# Patient Record
Sex: MALE | Race: WHITE | Employment: FULL TIME | URBAN - METROPOLITAN AREA
[De-identification: names, ages, dates, MRNs, and addresses within clinical notes are randomized per-mention and may not be internally consistent; named-entity substitution may affect disease eponyms.]

---

## 2018-01-29 ENCOUNTER — OFFICE VISIT (OUTPATIENT)
Dept: URGENT CARE | Facility: URGENT CARE | Age: 28
End: 2018-01-29
Payer: COMMERCIAL

## 2018-01-29 VITALS — TEMPERATURE: 98.7 F | WEIGHT: 212.8 LBS | OXYGEN SATURATION: 97 % | HEART RATE: 91 BPM

## 2018-01-29 DIAGNOSIS — J22 LOWER RESPIRATORY INFECTION: Primary | ICD-10-CM

## 2018-01-29 PROCEDURE — 99213 OFFICE O/P EST LOW 20 MIN: CPT | Performed by: PHYSICIAN ASSISTANT

## 2018-01-29 RX ORDER — PREDNISONE 20 MG/1
40 TABLET ORAL DAILY
Qty: 10 TABLET | Refills: 0 | Status: SHIPPED | OUTPATIENT
Start: 2018-01-29 | End: 2018-02-03

## 2018-01-29 RX ORDER — AZITHROMYCIN 250 MG/1
TABLET, FILM COATED ORAL
Qty: 6 TABLET | Refills: 0 | Status: SHIPPED | OUTPATIENT
Start: 2018-01-29 | End: 2018-02-17

## 2018-01-29 RX ORDER — BENZONATATE 200 MG/1
200 CAPSULE ORAL 3 TIMES DAILY PRN
Qty: 21 CAPSULE | Refills: 0 | Status: SHIPPED | OUTPATIENT
Start: 2018-01-29 | End: 2019-01-01

## 2018-01-29 NOTE — MR AVS SNAPSHOT
"              After Visit Summary   2018    Kj Fry    MRN: [id number]           Patient Information     Date Of Birth          1990        Visit Information        Provider Department      2018 5:30 PM Dara Lyle PA-C [facility]        Today's Diagnoses     Lower respiratory infection    -  1       Follow-ups after your visit        Who to contact     If you have questions or need follow up information about today's clinic visit or your schedule please contact [facility] directly at [phone].  Normal or non-critical lab and imaging results will be communicated to you by [employer], letter or phone within 4 business days after the clinic has received the results. If you do not hear from us within 7 days, please contact the clinic through [employer] or phone. If you have a critical or abnormal lab result, we will notify you by phone as soon as possible.  Submit refill requests through [employer] or call your pharmacy and they will forward the refill request to us. Please allow 3 business days for your refill to be completed.          Additional Information About Your Visit        MyChart Information     [employer] lets you send messages to your doctor, view your test results, renew your prescriptions, schedule appointments and more. To sign up, go to [email or web address] . Click on \"Log in\" on the left side of the screen, which will take you to the Welcome page. Then click on \"Sign up Now\" on the right side of the page.     You will be asked to enter the access code listed below, as well as some personal information. Please follow the directions to create your username and password.     Your access code is: [email or web address]  Expires: 2018  6:41 PM     Your access code will  in 90 days. If you need help or a new code, please call your [place] clinic or [phone].        Care EveryWhere ID     This is your Care EveryWhere ID. This could be used " by other organizations to access your [place] medical records  [id number]        Your Vitals Were     Pulse Temperature Pulse Oximetry             91 98.7  F (37.1  C) (Oral) 97%          Blood Pressure from Last 3 Encounters:   08/03/16 110/82    Weight from Last 3 Encounters:   01/29/18 212 lb 12.8 oz (96.5 kg)   08/03/16 190 lb 3.2 oz (86.3 kg)              Today, you had the following     No orders found for display         Today's Medication Changes          These changes are accurate as of 1/29/18  6:41 PM.  If you have any questions, ask your nurse or doctor.               Start taking these medicines.        Dose/Directions    azithromycin 250 MG tablet   Commonly known as:  ZITHROMAX   Used for:  Lower respiratory infection        Two tablets first day, then one tablet daily for four days.   Quantity:  6 tablet   Refills:  0       benzonatate 200 MG capsule   Commonly known as:  TESSALON   Used for:  Lower respiratory infection        Dose:  200 mg   Take 1 capsule (200 mg) by mouth 3 times daily as needed for cough   Quantity:  21 capsule   Refills:  0       predniSONE 20 MG tablet   Commonly known as:  DELTASONE   Used for:  Lower respiratory infection        Dose:  40 mg   Take 2 tablets (40 mg) by mouth daily for 5 days   Quantity:  10 tablet   Refills:  0            Where to get your medicines      These medications were sent to [facility] [address] [place] [address] [place] [address]  AT [facility]  [address] [place] [address]     Phone:  [phone]     azithromycin 250 MG tablet    benzonatate 200 MG capsule    predniSONE 20 MG tablet                Primary Care Provider Fax #    Physician No Ref-Primary [phone]       No address on file        Equal Access to Services     [facility]: Thelma Hammer, waaxda luqadaha, qaybta kaalrosario peterson. So [facility] [phone].    ATENCIÓN: Si morenita torres  disposición servicios gratuitos de asistencia lingüística. Rickey jaeger [phone].    We comply with applicable federal civil rights laws and [place] laws. We do not discriminate on the basis of race, color, national origin, age, disability, sex, sexual orientation, or gender identity.            Thank you!     Thank you for choosing [facility]  for your care. Our goal is always to provide you with excellent care. Hearing back from our patients is one way we can continue to improve our services. Please take a few minutes to complete the written survey that you may receive in the mail after your visit with us. Thank you!             Your Updated Medication List - Protect others around you: Learn how to safely use, store and throw away your medicines at www.disposemymeds.org.          This list is accurate as of 1/29/18  6:41 PM.  Always use your most recent med list.                   Brand Name Dispense Instructions for use Diagnosis    azithromycin 250 MG tablet    ZITHROMAX    6 tablet    Two tablets first day, then one tablet daily for four days.    Lower respiratory infection       benzonatate 200 MG capsule    TESSALON    21 capsule    Take 1 capsule (200 mg) by mouth 3 times daily as needed for cough    Lower respiratory infection       predniSONE 20 MG tablet    DELTASONE    10 tablet    Take 2 tablets (40 mg) by mouth daily for 5 days    Lower respiratory infection

## 2018-01-30 NOTE — PROGRESS NOTES
SUBJECTIVE:  Kj Fry is a 27 year old male who presents to the clinic today with a chief complaint of cough , shortness of breath. and wheezing. for 10 day(s).  His cough is described as persistent, nightime, nonproductive, wheezing and cough.    The patient's symptoms are moderate and stable.  Associated symptoms include wheezing. The patient's symptoms are exacerbated by no particular triggers  Patient has been using OTC cough suppressants and Mucinex  to improve symptoms. He has a history of asthma and has been using his inhaler to assist in his symptoms.     No past medical history on file.    No current outpatient prescriptions on file.       Social History   Substance Use Topics     Smoking status: Never Smoker     Smokeless tobacco: Not on file     Alcohol use Not on file     ROS:  C: NEGATIVE for fever, chills, change in weight  INTEGUMENTARY/SKIN: NEGATIVE for worrisome rashes, moles or lesions  E/M: NEGATIVE for sore throat, ear or sinus problems  R: POSITIVE for cough  CV: NEGATIVE for chest pain, palpitations or peripheral edema  GI: NEGATIVE for nausea, abdominal pain, heartburn, or change in bowel habits      OBJECTIVE:  Pulse 91  Temp 98.7  F (37.1  C) (Oral)  Wt 212 lb 12.8 oz (96.5 kg)  SpO2 97%  GENERAL APPEARANCE: healthy, alert and no distress  EYES: EOMI,  PERRL, conjunctiva clear  HENT: ear canals and TM's normal.  Nose and mouth without ulcers, erythema or lesions  NECK: supple, nontender, no lymphadenopathy  RESP: expiratory wheezes R upper posterior  CV: regular rates and rhythm, normal S1 S2, no murmur noted  ABDOMEN:  soft, nontender, no HSM or masses and bowel sounds normal  NEURO: Normal strength and tone, sensory exam grossly normal,  normal speech and mentation  SKIN: no suspicious lesions or rashes    ASSESSMENT:  Lower respiratory infection      PLAN:  Fill ZPACK if no improvement in the cough over the next 3 days. Patient agrees with treatment plan.   See orders in  Epic  Symptomatic measures encouraged, humidified air, plenty of fluids.    Dara Lyle PA-C

## 2018-02-17 ENCOUNTER — OFFICE VISIT (OUTPATIENT)
Dept: URGENT CARE | Facility: URGENT CARE | Age: 28
End: 2018-02-17
Payer: COMMERCIAL

## 2018-02-17 VITALS
RESPIRATION RATE: 24 BRPM | DIASTOLIC BLOOD PRESSURE: 90 MMHG | SYSTOLIC BLOOD PRESSURE: 130 MMHG | TEMPERATURE: 98.4 F | HEART RATE: 94 BPM | WEIGHT: 208 LBS | OXYGEN SATURATION: 94 %

## 2018-02-17 DIAGNOSIS — J11.1 INFLUENZA: Primary | ICD-10-CM

## 2018-02-17 DIAGNOSIS — R50.9 FEVER IN ADULT: ICD-10-CM

## 2018-02-17 LAB
FLUAV+FLUBV AG SPEC QL: NEGATIVE
FLUAV+FLUBV AG SPEC QL: POSITIVE
SPECIMEN SOURCE: ABNORMAL

## 2018-02-17 PROCEDURE — 99213 OFFICE O/P EST LOW 20 MIN: CPT | Performed by: NURSE PRACTITIONER

## 2018-02-17 PROCEDURE — 87804 INFLUENZA ASSAY W/OPTIC: CPT | Performed by: NURSE PRACTITIONER

## 2018-02-17 RX ORDER — ALBUTEROL SULFATE 90 UG/1
2 AEROSOL, METERED RESPIRATORY (INHALATION) EVERY 4 HOURS PRN
Qty: 1 INHALER | Refills: 1 | Status: SHIPPED | OUTPATIENT
Start: 2018-02-17

## 2018-02-17 RX ORDER — ALBUTEROL SULFATE 90 UG/1
2 AEROSOL, METERED RESPIRATORY (INHALATION) EVERY 6 HOURS
COMMUNITY
End: 2018-02-17

## 2018-02-17 NOTE — MR AVS SNAPSHOT
After Visit Summary   2/17/2018    Kj Fry    MRN: [id number]           Patient Information     Date Of Birth          1990        Visit Information        Provider Department      2/17/2018 6:15 PM Maddie Denny APRN CNP [facility]        Today's Diagnoses     Influenza    -  1    Fever in adult          Care Instructions      Influenza (Adult)    Influenza is also called the flu. It is a viral illness that affects the air passages of your lungs. It is different from the common cold. The flu can easily be passed from one to person to another. It may be spread through the air by coughing and sneezing. Or it can be spread by touching the sick person and then touching your own eyes, nose, or mouth.  The flu starts 1 to 3 days after you are exposed to the flu virus. It may last for 1 to 2 weeks but many people feel tired or fatigued for many weeks afterward. You usually don t need to take antibiotics unless you have a complication. This might be an ear or sinus infection or pneumonia.  Symptoms of the flu may be mild or severe. They can include extreme tiredness (wanting to stay in bed all day), chills, fevers, muscle aches, soreness with eye movement, headache, and a dry, hacking cough.  Home care  Follow these guidelines when caring for yourself at home:    Avoid being around cigarette smoke, whether yours or other people s.    Acetaminophen or ibuprofen will help ease your fever, muscle aches, and headache. Don t give aspirin to anyone younger than 18 who has the flu. Aspirin can harm the liver.    Nausea and loss of appetite are common with the flu. Eat light meals. Drink 6 to 8 glasses of liquids every day. Good choices are water, sport drinks, soft drinks without caffeine, juices, tea, and soup. Extra fluids will also help loosen secretions in your nose and lungs.    Over-the-counter cold medicines will not make the flu go away faster. But the  medicines may help with coughing, sore throat, and congestion in your nose and sinuses. Don t use a decongestant if you have high blood pressure.    Stay home until your fever has been gone for at least 24 hours without using medicine to reduce fever.  Follow-up care  Follow up with your healthcare provider, or as advised, if you are not getting better over the next week.  If you are age 65 or older, talk with your provider about getting a pneumococcal vaccine every 5 years. You should also get this vaccine if you have chronic asthma or COPD. All adults should get a flu vaccine every fall. Ask your provider about this.  When to seek medical advice  Call your healthcare provider right away if any of these occur:    Cough with lots of colored mucus (sputum) or blood in your mucus    Chest pain, shortness of breath, wheezing, or trouble breathing    Severe headache, or face, neck, or ear pain    New rash with fever    Fever of 100.4 F (38 C) or higher, or as directed by your healthcare provider    Confusion, behavior change, or seizure    Severe weakness or dizziness    You get a new fever or cough after getting better for a few days  Date Last Reviewed: 1/1/2017 2000-2017 [employer]. [address] [place] [address]. All rights reserved. This information is not intended as a substitute for professional medical care. Always follow your healthcare professional's instructions.                Follow-ups after your visit        Follow-up notes from your care team     Return if symptoms worsen or fail to improve, for follow up with PCP .      Who to contact     If you have questions or need follow up information about today's clinic visit or your schedule please contact [place] [facility] directly at [phone].  Normal or non-critical lab and imaging results will be communicated to you by MyChart, letter or phone within 4 business days after the clinic has received the  "results. If you do not hear from us within 7 days, please contact the clinic through [employer] or phone. If you have a critical or abnormal lab result, we will notify you by phone as soon as possible.  Submit refill requests through [employer] or call your pharmacy and they will forward the refill request to us. Please allow 3 business days for your refill to be completed.          Additional Information About Your Visit        [employer] Information     [employer] lets you send messages to your doctor, view your test results, renew your prescriptions, schedule appointments and more. To sign up, go to [email or web address] . Click on \"Log in\" on the left side of the screen, which will take you to the Welcome page. Then click on \"Sign up Now\" on the right side of the page.     You will be asked to enter the access code listed below, as well as some personal information. Please follow the directions to create your username and password.     Your access code is: [email or web address]  Expires: 2018  6:41 PM     Your access code will  in 90 days. If you need help or a new code, please call your [place] clinic or [phone].        Care EveryWhere ID     This is your Care EveryWhere ID. This could be used by other organizations to access your [place] medical records  [id number]        Your Vitals Were     Pulse Temperature Respirations Pulse Oximetry          94 98.4  F (36.9  C) 24 94%         Blood Pressure from Last 3 Encounters:   18 130/90   16 110/82    Weight from Last 3 Encounters:   18 208 lb (94.3 kg)   18 212 lb 12.8 oz (96.5 kg)   16 190 lb 3.2 oz (86.3 kg)              We Performed the Following     Influenza A/B antigen          Today's Medication Changes          These changes are accurate as of 18  8:22 PM.  If you have any questions, ask your nurse or doctor.               These medicines have changed or have updated prescriptions.        Dose/Directions    albuterol 108 " (90 BASE) MCG/ACT Inhaler   Commonly known as:  PROAIR HFA/PROVENTIL HFA/VENTOLIN HFA   This may have changed:    - when to take this  - reasons to take this   Used for:  Influenza   Changed by:  Maddie Denny APRN CNP        Dose:  2 puff   Inhale 2 puffs into the lungs every 4 hours as needed for shortness of breath / dyspnea or wheezing   Quantity:  1 Inhaler   Refills:  1            Where to get your medicines      These medications were sent to [facility] [id number] [place] [address] [address] AT [facility] [address], [facility] [address]    Hours:  24-hours Phone:  [phone]     albuterol 108 (90 BASE) MCG/ACT Inhaler                Primary Care Provider Fax #    Physician No Ref-Primary [phone]       No address on file        Equal Access to Services     TRICE REEDER : Hadii aad ku hadasho Sotaisha, waaxda luqadaha, qaybta kaalmada adeegyada, waxay molina marin . So [facility] [phone].    ATENCIÓN: Si habla español, tiene a olmstead disposición servicios gratuitos de asistencia lingüística. [facility] [phone].    We comply with applicable federal civil rights laws and [place] laws. We do not discriminate on the basis of race, color, national origin, age, disability, sex, sexual orientation, or gender identity.            Thank you!     Thank you for choosing [place] [facility]  for your care. Our goal is always to provide you with excellent care. Hearing back from our patients is one way we can continue to improve our services. Please take a few minutes to complete the written survey that you may receive in the mail after your visit with us. Thank you!             Your Updated Medication List - Protect others around you: Learn how to safely use, store and throw away your medicines at www.disposemymeds.org.          This list is accurate as of 2/17/18  8:22 PM.  Always use your most recent med list.                    Brand Name Dispense Instructions for use Diagnosis    albuterol 108 (90 BASE) MCG/ACT Inhaler    PROAIR HFA/PROVENTIL HFA/VENTOLIN HFA    1 Inhaler    Inhale 2 puffs into the lungs every 4 hours as needed for shortness of breath / dyspnea or wheezing    Influenza       benzonatate 200 MG capsule    TESSALON    21 capsule    Take 1 capsule (200 mg) by mouth 3 times daily as needed for cough    Lower respiratory infection

## 2018-02-18 NOTE — PROGRESS NOTES
SUBJECTIVE: Kj Fry is a 27 year old male, asthma history, who present complaining of flu-like symptoms: fevers, chills, nigh sweats, myalgias, congestion, sore throat, and cough for 3 days. Reports multiple ill contacts at home with flu. Last ibuprofen OTC with relief, taken 1 hour previous. Denies dyspnea or wheezing.    OBJECTIVE:  /90  Pulse 94  Temp 98.4  F (36.9  C)  Resp 24  Wt 208 lb (94.3 kg)  SpO2 94%  Appears moderately ill but not toxic; temperature as noted in vitals. Ears normal. Throat and pharynx minimal erythema.  Neck supple. No adenopathy in the neck. Sinuses non tender. CV: S1, S2 auscultated with regular rate and rhythm, no gallops, murmurs or rubs. Lungs: Bilateral lobes clear throughout without wheezes, crackles, or rhonchi noted.     ASSESSMENT: Influenza    PLAN: Discussed with patient causes for infleunza. Advised ongoing symptomatic cares with warm gargle, use acetaminophen or other OTC analgesic and fever control, increased soups, water and rest to stay hydrated. Letter for work provided.     Follow up with PCP in 1 week if not improved or sooner if symptoms worsen or do not improve as discussed.      JANESSA Carrington, CNP

## 2018-02-18 NOTE — PATIENT INSTRUCTIONS
Influenza (Adult)    Influenza is also called the flu. It is a viral illness that affects the air passages of your lungs. It is different from the common cold. The flu can easily be passed from one to person to another. It may be spread through the air by coughing and sneezing. Or it can be spread by touching the sick person and then touching your own eyes, nose, or mouth.  The flu starts 1 to 3 days after you are exposed to the flu virus. It may last for 1 to 2 weeks but many people feel tired or fatigued for many weeks afterward. You usually don t need to take antibiotics unless you have a complication. This might be an ear or sinus infection or pneumonia.  Symptoms of the flu may be mild or severe. They can include extreme tiredness (wanting to stay in bed all day), chills, fevers, muscle aches, soreness with eye movement, headache, and a dry, hacking cough.  Home care  Follow these guidelines when caring for yourself at home:    Avoid being around cigarette smoke, whether yours or other people s.    Acetaminophen or ibuprofen will help ease your fever, muscle aches, and headache. Don t give aspirin to anyone younger than 18 who has the flu. Aspirin can harm the liver.    Nausea and loss of appetite are common with the flu. Eat light meals. Drink 6 to 8 glasses of liquids every day. Good choices are water, sport drinks, soft drinks without caffeine, juices, tea, and soup. Extra fluids will also help loosen secretions in your nose and lungs.    Over-the-counter cold medicines will not make the flu go away faster. But the medicines may help with coughing, sore throat, and congestion in your nose and sinuses. Don t use a decongestant if you have high blood pressure.    Stay home until your fever has been gone for at least 24 hours without using medicine to reduce fever.  Follow-up care  Follow up with your healthcare provider, or as advised, if you are not getting better over the next week.  If you are age 65 or  older, talk with your provider about getting a pneumococcal vaccine every 5 years. You should also get this vaccine if you have chronic asthma or COPD. All adults should get a flu vaccine every fall. Ask your provider about this.  When to seek medical advice  Call your healthcare provider right away if any of these occur:    Cough with lots of colored mucus (sputum) or blood in your mucus    Chest pain, shortness of breath, wheezing, or trouble breathing    Severe headache, or face, neck, or ear pain    New rash with fever    Fever of 100.4 F (38 C) or higher, or as directed by your healthcare provider    Confusion, behavior change, or seizure    Severe weakness or dizziness    You get a new fever or cough after getting better for a few days  Date Last Reviewed: 1/1/2017 2000-2017 [employer]. [address] [place] [address], [place] [address]. All rights reserved. This information is not intended as a substitute for professional medical care. Always follow your healthcare professional's instructions.

## 2019-01-01 ENCOUNTER — OFFICE VISIT (OUTPATIENT)
Dept: URGENT CARE | Facility: URGENT CARE | Age: 29
End: 2019-01-01

## 2019-01-01 VITALS
RESPIRATION RATE: 16 BRPM | HEART RATE: 76 BPM | TEMPERATURE: 98.6 F | SYSTOLIC BLOOD PRESSURE: 126 MMHG | WEIGHT: 210 LBS | OXYGEN SATURATION: 98 % | DIASTOLIC BLOOD PRESSURE: 76 MMHG

## 2019-01-01 DIAGNOSIS — J45.21 MILD INTERMITTENT ASTHMA WITH EXACERBATION: Primary | ICD-10-CM

## 2019-01-01 PROCEDURE — 99214 OFFICE O/P EST MOD 30 MIN: CPT | Performed by: PHYSICIAN ASSISTANT

## 2019-01-01 RX ORDER — METHYLPREDNISOLONE 4 MG
TABLET, DOSE PACK ORAL
Qty: 21 TABLET | Refills: 0 | Status: SHIPPED | OUTPATIENT
Start: 2019-01-01 | End: 2020-06-13

## 2019-01-01 RX ORDER — AZITHROMYCIN 250 MG/1
TABLET, FILM COATED ORAL
Qty: 6 TABLET | Refills: 0 | Status: SHIPPED | OUTPATIENT
Start: 2019-01-01 | End: 2020-06-13

## 2019-01-01 ASSESSMENT — ENCOUNTER SYMPTOMS
ABDOMINAL PAIN: 0
SHORTNESS OF BREATH: 1
CHILLS: 0
FOCAL WEAKNESS: 0
VOMITING: 0
FEVER: 1
WHEEZING: 1
DIARRHEA: 0
COUGH: 1
HEADACHES: 1
NAUSEA: 0

## 2019-01-01 NOTE — PROGRESS NOTES
HPI  January 1, 2019    HPI: Kj Fry is a 28 year old male with hx asthma who complains of moderate productive cough, wheezing, SOB,  HA, & low grade fever onset 1 week ago. Symptoms are constant in duration. Pt is using albuterol neb & inhaler as well as Mucinex with some relief. Denies HA, CP, abd pain, N/V/D, rash, or any other symptoms. Patient denies sick contacts.    History reviewed. No pertinent past medical history.  History reviewed. No pertinent surgical history.  Social History     Tobacco Use     Smoking status: Never Smoker     Smokeless tobacco: Never Used   Substance Use Topics     Alcohol use: Not on file     Drug use: Not on file     There is no problem list on file for this patient.    History reviewed. No pertinent family history.     Problem list, Medication list, Allergies, and Medical/Social/Surgical histories reviewed in [facility] and updated as appropriate.      Review of Systems   Constitutional: Positive for fever. Negative for chills.   Respiratory: Positive for cough, shortness of breath and wheezing.    Cardiovascular: Negative for chest pain.   Gastrointestinal: Negative for abdominal pain, diarrhea, nausea and vomiting.   Skin: Negative for rash.   Neurological: Positive for headaches. Negative for focal weakness.   All other systems reviewed and are negative.        Physical Exam   Constitutional: He is oriented to person, place, and time.   HENT:   Head: Normocephalic and atraumatic.   Right Ear: Tympanic membrane and external ear normal.   Left Ear: Tympanic membrane and external ear normal.   Nose: Nose normal.   Mouth/Throat: Uvula is midline, oropharynx is clear and moist and mucous membranes are normal.   Cardiovascular: Normal rate, regular rhythm and normal heart sounds.   Pulmonary/Chest: Effort normal and breath sounds normal.   Musculoskeletal: Normal range of motion.   Neurological: He is alert and oriented to person, place, and time.   Skin: Skin is warm and dry.    Nursing note and vitals reviewed.      Vital Signs  /76 (BP Location: Left arm, Patient Position: Sitting, Cuff Size: Adult Large)   Pulse 76   Temp 98.6  F (37  C) (Tympanic)   Resp 16   Wt 95.3 kg (210 lb)   SpO2 98%      Diagnostic Test Results:  none     ASSESSMENT/PLAN      ICD-10-CM    1. Mild intermittent asthma with exacerbation J45.21 azithromycin (ZITHROMAX) 250 MG tablet     methylPREDNISolone (MEDROL DOSEPAK) 4 MG tablet therapy pack      Lungs CTAB, O2 sat 98%, no resp distress, afebrile. Suspect viral URI leading to asthma exacerbation. Rx Medrol dosepak, continue all other treatments. If not improving after another 4-5 days, may start antibiotic.      I have discussed any lab or imaging results, the patient's diagnosis, and my plan of treatment with the patient and/or family. Patient is aware to come back in if with worsening symptoms or if no relief despite treatment plan.  Patient voiced understanding and had no further questions.       Follow Up: Return if symptoms worsen or fail to improve.    JOSE MARIA Selby, PA-C  [place] [facility]

## 2020-04-14 ENCOUNTER — NURSE TRIAGE (OUTPATIENT)
Dept: NURSING | Facility: CLINIC | Age: 30
End: 2020-04-14

## 2020-04-14 ENCOUNTER — HOSPITAL ENCOUNTER (EMERGENCY)
Facility: CLINIC | Age: 30
Discharge: HOME | End: 2020-04-14
Attending: EMERGENCY MEDICINE | Admitting: EMERGENCY MEDICINE
Payer: COMMERCIAL

## 2020-04-14 ENCOUNTER — APPOINTMENT (OUTPATIENT)
Dept: GENERAL RADIOLOGY | Facility: CLINIC | Age: 30
End: 2020-04-14
Attending: EMERGENCY MEDICINE
Payer: COMMERCIAL

## 2020-04-14 VITALS
RESPIRATION RATE: 18 BRPM | TEMPERATURE: 98.1 F | HEIGHT: 68 IN | BODY MASS INDEX: 31.83 KG/M2 | WEIGHT: 210 LBS | DIASTOLIC BLOOD PRESSURE: 85 MMHG | OXYGEN SATURATION: 94 % | SYSTOLIC BLOOD PRESSURE: 138 MMHG

## 2020-04-14 DIAGNOSIS — R06.02 SHORTNESS OF BREATH: ICD-10-CM

## 2020-04-14 DIAGNOSIS — R93.89 ABNORMAL X-RAY: ICD-10-CM

## 2020-04-14 PROCEDURE — 99283 EMERGENCY DEPT VISIT LOW MDM: CPT | Mod: 25

## 2020-04-14 PROCEDURE — 71045 X-RAY EXAM CHEST 1 VIEW: CPT

## 2020-04-14 RX ORDER — PREDNISONE 20 MG/1
TABLET ORAL
Qty: 10 TABLET | Refills: 0 | Status: SHIPPED | OUTPATIENT
Start: 2020-04-14 | End: 2020-06-13

## 2020-04-14 RX ORDER — DOXYCYCLINE HYCLATE 100 MG
100 TABLET ORAL 2 TIMES DAILY
Qty: 10 TABLET | Refills: 0 | Status: SHIPPED | OUTPATIENT
Start: 2020-04-14 | End: 2020-06-13

## 2020-04-14 ASSESSMENT — ENCOUNTER SYMPTOMS
FEVER: 1
SHORTNESS OF BREATH: 1
DIARRHEA: 1
CHILLS: 1
COUGH: 1

## 2020-04-14 ASSESSMENT — MIFFLIN-ST. JEOR: SCORE: 1877.05

## 2020-04-14 NOTE — TELEPHONE ENCOUNTER
"SOB for about 1 week ago, states not uncommon this time of year.   Diarrhea the last couple of days.   Has asthma, using medication more frequently now.   + Cough x a few days, worse at night.   Feels feverish, denies fever.  Extremely fatigued.   States he feels out of it, girlfriend said he has seemed out of it.   When asked if he is confused he stated \"Debora\" but then stated its more just slower to respond.   Feeling a little lightheaded.   Slow to process per girlfriend.   Muffled speech, not slurred, thought to be from being fatigued.       RN advised of need to go to ED. Recommended having girlfriend drive him, advised of no visitors allowed in ED and to drop him off.  Patient stated understanding.     [facility] notified of pending arrival.     COVID 19 Nurse Triage Plan/Patient Instructions    Please be aware that novel coronavirus (COVID-19) may be circulating in the community. If you develop symptoms such as fever, cough, or SOB or if you have concerns about the presence of another infection including coronavirus (COVID-19), please contact your health care provider or visit [email or web address].     Disposition/Instructions    Patient to go to ED and follow protocol based instructions. Follow System Ambulatory Workflow for COVID 19.     Bring Your Own Device:  Please also bring your smart device(s) (smart phones, tablets, laptops) and their charging cables for your personal use and to communicate with your care team during your visit.  Additional COVID19 information to add for patients.     Additional General Information About COVID-19    COVID-19 - General Information:  Regardless of if you have been tested or not:  Patient who have symptoms (cough, fever, or shortness of breath), need to isolate for 7 days from when symptoms started AND 72 hours after fever resolves (without fever reducing medications) AND improvement of respiratory symptoms (whichever is longer).      Isolate yourself at " home (in own room/own bathroom if possible)    Do Not allow any visitors    Do Not go to work or school    Do Not go to [employer],  centers, shopping, or other public places.    Do Not shake hands.    Avoid close and intimate contact with others (hugging, kissing).    Follow CDC recommendations for household cleaning of frequently touched services.     After the initial 7 days, continue to isolate yourself from household members as much as possible. To continue decrease the risk of community spread and exposure, you and any members of your household should limit activities in public for 14 days after starting home isolation.     You can reference the following CDC link for helpful home isolation/care tips:  https://www.cdc.gov/coronavirus/2019-ncov/downloads/10Things.pdf    COVID-19 - Symptoms:     The COVID-19 can cause a respiratory illness, such as bronchitis or pneumonia.    The most common symptoms are: cough, fever, and shortness of breath.     Other symptoms are: body aches, chills, diarrhea, fatigue, headache, runny nose, and sore throat     COVID-19 - Exposure Risk Factors:    Exposure to a person who has been diagnosed with COVID-19 .    Travel from an area with recent local transmission of COVID-19 .    The CDC (www.cdc.gov) has the most up-to-date list of where the COVID-19 outbreak is occurring.    COVID-19 - Spreading:     The virus likely spreads through respiratory droplets produced when a person coughs or sneezes. These respiratory droplets can travel approximately 6 feet and can remain on surfaces.  Common disinfectants will kill the virus.    The CDC currently does not recommend healthy people wearing masks.    COVID-19 - Protect Yourself:     Avoid close contact with people known to have this new COVID-19 infection.    Wash hands often with soap and water or alcohol-based hand .    Avoid touching the eyes, nose or mouth.       Thank you for limiting contact with others, wearing a  simple mask to cover your cough, practice good hand hygiene habits and accessing our virtual services where possible to limit the spread of this virus.    For more information about COVID19 and options for caring for yourself at home, please visit the CDC website at https://www.cdc.gov/coronavirus/2019-ncov/about/steps-when-sick.html  For more options for care at [facility], please visit our website at [email or web address]    For more information, please use the [place] Department of Health ([facility]) COVID-19 Hotlines (Interpreters available):     Health questions: Phone Number: [phone] or [phone] and Hours: 7 a.m. to 7 p.m.    Schools and  questions: Phone Number: [phone] or [phone] and Hours 7 a.m. to 7 p.m.                    Reason for Disposition    SEVERE or constant chest pain (Exception: mild central chest pain, present only when coughing)    MODERATE difficulty breathing (e.g., speaks in phrases, SOB even at rest, pulse 100-120)    Additional Information    Negative: SEVERE difficulty breathing (e.g., struggling for each breath, speaks in single words)    Negative: Difficult to awaken or acting confused (e.g., disoriented, slurred speech)    Negative: Bluish (or gray) lips or face now    Negative: Shock suspected (e.g., cold/pale/clammy skin, too weak to stand, low BP, rapid pulse)    Negative: Sounds like a life-threatening emergency to the triager    Negative: [1] COVID-19 suspected (e.g., cough, fever, shortness of breath) AND [2] public health department recommends testing    Negative: [1] COVID-19 exposure AND [2] no symptoms    Negative: COVID-19 and Breastfeeding, questions about    Protocols used: CORONAVIRUS (COVID-19) DIAGNOSED OR [id number] 3.30.20

## 2020-04-14 NOTE — ED PROVIDER NOTES
"  History     Chief Complaint:  Shortness of Breath      HPI   Kj Fry is a 30 year old male with a history of asthma, who presents for evaluation of shortness of breath, cough, chills, diarrhea, subjective fever, and feeling overall unwell beginning yesterday. He reports being able to get dressed without feeling short of breath. His girlfriend is sick with similar symptoms as well. Patient denies known COVID-19 positive contacts. Denies any other concerns.     Allergies:  Ceclor     Medications:    Albuterol    Past Medical History:    Asthma     Past Surgical History:    History reviewed. No pertinent surgical history.     Family History:    History reviewed. No pertinent family history.      Social History:  Smoking status: never   PCP: Physician No Ref-Primary  Presents to the ED alone.   Marital Status:  Single      Review of Systems   Constitutional: Positive for chills and fever (subjective).   Respiratory: Positive for cough and shortness of breath.    Gastrointestinal: Positive for diarrhea.   All other systems reviewed and are negative.      Physical Exam     Patient Vitals for the past 24 hrs:   BP Temp Temp src Heart Rate Resp SpO2 Height Weight   04/14/20 1630 138/85 98.1  F (36.7  C) Oral 77 18 94 % 1.727 m (5' 8\") 95.3 kg (210 lb)       Physical Exam  Vitals: reviewed by me  General: Pt seen on [facility], pleasant, cooperative, and alert to conversation  Eyes: Tracking well, clear conjunctiva BL  ENT: MMM, midline trachea.   Lungs: No tachypnea, no accessory muscle use. No respiratory distress.   CV: Rate as above, regular rhythm.    MSK:  No evidence of trauma  Skin: No rash, normal turgor and temperature  Neuro: Clear speech and no facial droop.  Psych: Not RIS, no e/o AH/VH      Emergency Department Course     Imaging:  Radiographic findings were communicated with the patient who voiced understanding of the findings.    XR Chest Port 1 View  No pleural fluid or pneumothorax. Subtle " opacities at the  left lung base are likely atelectasis.  Airspace disease is possible. No interstitial edema. The  cardiomediastinal silhouette is normal in size.  As read by Radiology.    Emergency Department Course:  Past medical records, nursing notes, and vitals reviewed.    1658: I performed an exam of the patient as documented above.     The patient had a portable chest x-ray while here in the emergency department, findings above.    1808: I rechecked the patient and discussed the results of his workup thus far.     Findings and plan explained to the Patient. Patient discharged home with instructions regarding supportive care, medications, and reasons to return. The importance of close follow-up was reviewed.     I personally answered all related questions prior to discharge.     Impression & Plan   Covid-19  Kj Fry was evaluated during a global COVID-19 pandemic, which necessitated consideration that the patient might be at risk for infection with the SARS-CoV-2 virus that causes COVID-19.   Applicable protocols for evaluation were followed during the patient's care.    Medical Decision Making:  Kj Fry is a 30 year old male who presents to the ED with what appears to be shortness of breath for 2 days. He is doing quite well here, xray does show a small opacity, possibly atelectasis. No will evidence of COVID. With his asthma, will give instructions to use his home albuterol as well as steroids and antibiotics, because he is more vulnerable.  Patient is ok with this plan, saturating 95% on room air, states he can do all of his ADLs. Unfortunately, we are still not able to test this gentleman in this situation, will plan for discharge home with strict return precautions.     Diagnosis:    ICD-10-CM    1. Shortness of breath  R06.02    2. Abnormal x-ray  R93.89        Disposition:  Discharged to home.    Discharge Medications:  Discharge Medication List as of 4/14/2020  6:09 PM      START  taking these medications    Details   doxycycline hyclate (VIBRA-TABS) 100 MG tablet Take 1 tablet (100 mg) by mouth 2 times daily, Disp-10 tablet,R-0, Local Print      predniSONE (DELTASONE) 20 MG tablet Take two tablets (= 40mg) each day for 5 (five) days, Disp-10 tablet,R-0, Local Print             Scribe Disclosure:  I, Mirtha Guy, am serving as a scribe at 4:58 PM on 4/14/2020 to document services personally performed by Leander Cuellar MD based on my observations and the provider's statements to me.     Mirtha Guy  4/14/2020    EMERGENCY DEPARTMENT       Leander Cuellar MD  04/14/20 2193

## 2020-04-14 NOTE — ED AVS SNAPSHOT
Emergency Department  [address] [facility] [address]  Phone:  [phone]  Fax:  [phone]                                    Kj Fry   MRN: [id number]    Department:   Emergency Department   Date of Visit:  4/14/2020           After Visit Summary Signature Page    I have received my discharge instructions, and my questions have been answered. I have discussed any challenges I see with this plan with the nurse or doctor.    ..........................................................................................................................................  Patient/Patient Representative Signature      ..........................................................................................................................................  Patient Representative Print Name and Relationship to Patient    ..................................................               ................................................  Date                                   Time    ..........................................................................................................................................  Reviewed by Signature/Title    ...................................................              ..............................................  Date                                               Time          22EPIC Rev 08/18

## 2020-06-13 ENCOUNTER — OFFICE VISIT (OUTPATIENT)
Dept: URGENT CARE | Facility: URGENT CARE | Age: 30
End: 2020-06-13
Payer: COMMERCIAL

## 2020-06-13 VITALS
BODY MASS INDEX: 32.28 KG/M2 | OXYGEN SATURATION: 95 % | TEMPERATURE: 98.2 F | RESPIRATION RATE: 20 BRPM | DIASTOLIC BLOOD PRESSURE: 84 MMHG | HEIGHT: 68 IN | WEIGHT: 213 LBS | HEART RATE: 90 BPM | SYSTOLIC BLOOD PRESSURE: 130 MMHG

## 2020-06-13 DIAGNOSIS — L30.9 DERMATITIS: Primary | ICD-10-CM

## 2020-06-13 PROCEDURE — 99214 OFFICE O/P EST MOD 30 MIN: CPT | Performed by: PHYSICIAN ASSISTANT

## 2020-06-13 RX ORDER — TRIAMCINOLONE ACETONIDE 1 MG/G
CREAM TOPICAL 2 TIMES DAILY
Qty: 30 G | Refills: 0 | Status: SHIPPED | OUTPATIENT
Start: 2020-06-13 | End: 2020-06-27

## 2020-06-13 RX ORDER — PREDNISONE 20 MG/1
40 TABLET ORAL DAILY
Qty: 10 TABLET | Refills: 0 | Status: SHIPPED | OUTPATIENT
Start: 2020-06-13 | End: 2020-06-18

## 2020-06-13 ASSESSMENT — MIFFLIN-ST. JEOR: SCORE: 1890.66

## 2020-06-13 NOTE — PATIENT INSTRUCTIONS
Patient was educated on the natural course of rash.  Take medications as prescribed. Side effects discussed. Conservative measures discussed including over-the-counter antihistamines (Zyrtec). See your primary care provider if symptoms worsen or do not improve in 7 days. Seek emergency care if you develop severe pain/redness, shortness of breath, or difficulty swallowing.

## 2020-06-13 NOTE — PROGRESS NOTES
URGENT CARE VISIT:    SUBJECTIVE:   HPI:   Kj Fry is a 30 year old who presents with rash located over bilateral lower legs since 4 week(s) ago. Rash is gradual onset and rash seems to be worsening. He describes rash as itching. Patient denies difficulty breathing or throat/tongue swelling. Patient has tried antifungal cream with no relief of symptoms. Patient has not had contact exposures to new laundry detergents, soaps, lotions, or other potential irritants. Denies new foods or medications.  Patient denies previous history of a similar rash. No one around them has had a similar rash.     PMH: History reviewed. No pertinent past medical history.  Allergies: Ceclor [cefaclor]   Medications:   Current Outpatient Medications   Medication Sig Dispense Refill     albuterol (PROAIR HFA/PROVENTIL HFA/VENTOLIN HFA) 108 (90 BASE) MCG/ACT Inhaler Inhale 2 puffs into the lungs every 4 hours as needed for shortness of breath / dyspnea or wheezing 1 Inhaler 1     predniSONE (DELTASONE) 20 MG tablet Take 2 tablets (40 mg) by mouth daily for 5 days 10 tablet 0     triamcinolone (KENALOG) 0.1 % external cream Apply topically 2 times daily for 14 days 30 g 0     Social History:   Social History     Socioeconomic History     Marital status: Single     Spouse name: Not on file     Number of children: Not on file     Years of education: Not on file     Highest education level: Not on file   Occupational History     Not on file   Social Needs     Financial resource strain: Not on file     Food insecurity     Worry: Not on file     Inability: Not on file     Transportation needs     Medical: Not on file     Non-medical: Not on file   Tobacco Use     Smoking status: Never Smoker     Smokeless tobacco: Never Used   Substance and Sexual Activity     Alcohol use: Not on file     Drug use: Not on file     Sexual activity: Not on file   Lifestyle     Physical activity     Days per week: Not on file     Minutes per session: Not on  "file     Stress: Not on file   Relationships     Social connections     Talks on phone: Not on file     Gets together: Not on file     Attends Confucianist service: Not on file     Active member of club or organization: Not on file     Attends meetings of clubs or organizations: Not on file     Relationship status: Not on file     Intimate partner violence     Fear of current or ex partner: Not on file     Emotionally abused: Not on file     Physically abused: Not on file     Forced sexual activity: Not on file   Other Topics Concern     Not on file   Social History Narrative     Not on file       ROS: ROS otherwise found to be negative except as noted above.    OBJECTIVE:  /84 (BP Location: Right arm, Patient Position: Sitting, Cuff Size: Adult Regular)   Pulse 90   Temp 98.2  F (36.8  C) (Tympanic)   Resp 20   Ht 1.727 m (5' 8\")   Wt 96.6 kg (213 lb)   SpO2 95%   BMI 32.39 kg/m    General: WDWN in NAD.   Eyes: Non-injected conjunctivas without drainage bilaterally.  Ears: Bilateral TMs are easily visualized without erythema, injection, or effusion. No erythema or edema of external canals.    Oropharynx: No erythema of oropharynx. No edema of tongue.   Cardiac: RRR without murmurs, rubs, or gallops.  Respiratory: LCTAB without adventitious sounds. Non-labored breathing.  Integumentary:   Distribution: generalized  Location: bilateral shins and dorsal feet   Color: red,  Lesion type: papular, scattered discrete lesions with no other findings  Neuro: Alert and oriented.         ASSESSMENT:     ICD-10-CM    1. Dermatitis  L30.9 predniSONE (DELTASONE) 20 MG tablet     triamcinolone (KENALOG) 0.1 % external cream        PLAN:  Patient Instructions   Patient was educated on the natural course of rash.  Unsure of etiology. Take medications as prescribed. Side effects discussed. Conservative measures discussed including over-the-counter antihistamines (Zyrtec). See your primary care provider if symptoms worsen or " do not improve in 7 days. Seek emergency care if you develop severe pain/redness, shortness of breath, or difficulty swallowing. Patient verbalized understanding and is agreeable to plan. The patient was discharged ambulatory and in stable condition.    Tricia Colby PA-C on 6/13/2020 at 11:52 AM

## 2020-06-22 ENCOUNTER — OFFICE VISIT (OUTPATIENT)
Dept: URGENT CARE | Facility: URGENT CARE | Age: 30
End: 2020-06-22
Payer: COMMERCIAL

## 2020-06-22 VITALS
TEMPERATURE: 98.3 F | RESPIRATION RATE: 20 BRPM | HEIGHT: 68 IN | SYSTOLIC BLOOD PRESSURE: 127 MMHG | HEART RATE: 78 BPM | DIASTOLIC BLOOD PRESSURE: 87 MMHG | WEIGHT: 230 LBS | BODY MASS INDEX: 34.86 KG/M2 | OXYGEN SATURATION: 98 %

## 2020-06-22 DIAGNOSIS — K04.7 DENTAL ABSCESS: Primary | ICD-10-CM

## 2020-06-22 PROCEDURE — 99214 OFFICE O/P EST MOD 30 MIN: CPT | Performed by: FAMILY MEDICINE

## 2020-06-22 RX ORDER — CLINDAMYCIN HCL 300 MG
300 CAPSULE ORAL 4 TIMES DAILY
Qty: 40 CAPSULE | Refills: 0 | Status: SHIPPED | OUTPATIENT
Start: 2020-06-22 | End: 2020-07-02

## 2020-06-22 RX ORDER — HYDROCODONE BITARTRATE AND ACETAMINOPHEN 5; 325 MG/1; MG/1
1 TABLET ORAL EVERY 6 HOURS PRN
Qty: 12 TABLET | Refills: 0 | Status: SHIPPED | OUTPATIENT
Start: 2020-06-22 | End: 2020-06-25

## 2020-06-22 ASSESSMENT — PAIN SCALES - GENERAL: PAINLEVEL: EXTREME PAIN (9)

## 2020-06-22 ASSESSMENT — MIFFLIN-ST. JEOR: SCORE: 1967.77

## 2020-06-22 NOTE — PROGRESS NOTES
"SUBJECTIVE: Kj Fry is a 30 year old male presenting with a chief complaint of dental pain.  Onset of symptoms was day(s) ago.  Course of illness is worsening.    Severity moderate  Current and Associated symptoms: none  Treatment measures tried include None tried.  Predisposing factors include None.    No past medical history on file.  Allergies   Allergen Reactions     Ceclor [Cefaclor] Hives     Social History     Tobacco Use     Smoking status: Never Smoker     Smokeless tobacco: Never Used   Substance Use Topics     Alcohol use: Not on file       ROS:  SKIN: no rash  GI: no vomiting    OBJECTIVE:  /87 (BP Location: Right arm, Patient Position: Sitting, Cuff Size: Adult Regular)   Pulse 78   Temp 98.3  F (36.8  C) (Tympanic)   Resp 20   Ht 1.727 m (5' 8\")   Wt 104.3 kg (230 lb)   SpO2 98%   BMI 34.97 kg/m  GENERAL APPEARANCE: healthy, alert and no distress  EYES: EOMI,  PERRL, conjunctiva clear  HENT: TM's normal bilaterally, oral mucous membranes moist, no erythema noted and dental pain  NECK: supple, nontender, no lymphadenopathy  SKIN: no suspicious lesions or rashes      ICD-10-CM    1. Dental abscess  K04.7 clindamycin (CLEOCIN) 300 MG capsule     HYDROcodone-acetaminophen (NORCO) 5-325 MG tablet       Fluids/Rest, f/u if worse/not any better    "

## 2020-09-26 ENCOUNTER — OFFICE VISIT (OUTPATIENT)
Dept: URGENT CARE | Facility: URGENT CARE | Age: 30
End: 2020-09-26
Payer: COMMERCIAL

## 2020-09-26 VITALS
OXYGEN SATURATION: 96 % | RESPIRATION RATE: 16 BRPM | BODY MASS INDEX: 34.97 KG/M2 | TEMPERATURE: 98.2 F | WEIGHT: 230 LBS | HEART RATE: 80 BPM | SYSTOLIC BLOOD PRESSURE: 141 MMHG | DIASTOLIC BLOOD PRESSURE: 93 MMHG

## 2020-09-26 DIAGNOSIS — J45.901 EXACERBATION OF ASTHMA, UNSPECIFIED ASTHMA SEVERITY, UNSPECIFIED WHETHER PERSISTENT: ICD-10-CM

## 2020-09-26 DIAGNOSIS — H10.029 PINK EYE, UNSPECIFIED LATERALITY: Primary | ICD-10-CM

## 2020-09-26 PROCEDURE — 99214 OFFICE O/P EST MOD 30 MIN: CPT | Performed by: FAMILY MEDICINE

## 2020-09-26 RX ORDER — ALBUTEROL SULFATE 90 UG/1
2 AEROSOL, METERED RESPIRATORY (INHALATION) EVERY 6 HOURS
Qty: 1 INHALER | Refills: 0 | Status: SHIPPED | OUTPATIENT
Start: 2020-09-26 | End: 2020-10-26

## 2020-09-26 RX ORDER — TOBRAMYCIN 3 MG/ML
2 SOLUTION/ DROPS OPHTHALMIC 4 TIMES DAILY
Qty: 5 ML | Refills: 0 | Status: SHIPPED | OUTPATIENT
Start: 2020-09-26 | End: 2020-10-03

## 2020-09-26 NOTE — PATIENT INSTRUCTIONS
Patient Education     Discharge Instructions for Asthma  You have been diagnosed with an asthma attack. With the help of your healthcare provider, you can keep your asthma under control and have less emergency department visits and stays in the hospital.    Managing asthma    Take your asthma medicines exactly as your provider tells you. Do this even if you feel that your athma is under control.    Learn how to monitor your asthma. Some people watch for early changes of symptoms getting worse. Some use a peak flow meter. Your healthcare provider may decide to give you an asthma action plan.    Be sure to always have a quick-relief inhaler with you. If you were given a prescription, make sure you go to a pharmacy to get it filled as soon as possible.  Controlling asthma triggers  Triggers are those things that make your asthma symptoms worse or cause asthma attacks. Many people with asthma have allergies that can be triggers. Your healthcare provider may have you get allergy testing to find out what you are allergic to. This can help you stay away from triggers.  Dust or dust mites are a common asthma trigger. To avoid a dust mites, do the following:    Use dust-proof covers on your mattress and pillows. Wash the sheets and blankets on your bed once a week in very hot water.    Don t sleep or lie on cloth-covered cushions or furniture.    Ask someone else to vacuum and dust your house.    If you do vacuum and dust yourself, wear a dust mask. You can buy them from the [employer] store.    Use a vacuum with a double-layered bag or HEPA (high-efficiency particulate air) filter.  Pets with fur or feathers are triggers for some people. If you must have pets, take these precautions:    Keep pets out of your bedroom and off your bed. Keep the bedroom door closed.    Cover the air vents in your bedroom with heavy material to filter the air.    Don't use carpets or cloth-covered furniture in your home. If this is not  possible, keep pets out of rooms with these items.    Have someone bathe your pets every week. And brush them often.  If you smoke, do your best to quit.    Enroll in a stop-smoking program to increase your chance of success.    Ask your healthcare provider about medicines or other methods to help you quit.    Ask family members to quit smoking as well.    Don t allow anyone to smoke in your home, in your car, or around you.  Other steps to take    Make sure you know what to do if exercise is a trigger for you. Many people use quick-relief inhalers before exercise or physical activity.    Get a flu shot every year and get pneumonia shots as advised by your healthcare provider.    Try to keep your windows closed during pollen seasons and when mold counts are high.    On cold or windy days, cover your nose and mouth with a scarf.    Try to stay away from people who are sick with colds or the flu. Wash your hands often or use a hand . If respiratory infections like colds or flu trigger your asthma, use your quick-relief medicines as soon as you begin to notice respiratory symptoms. They may include a runny or stuffy nose, sore throat, or a cough.  Follow-up care  Make a follow-up appointment as directed. Follow your asthma action plan if you were given one.  Call 911  Call 911 right away if you have:    Severe wheezing    Shortness of breath that is not relieved by your quick-relief medicine    Trouble walking or talking because of shortness of breath    Blue lips or fingernails    If you monitor symptoms with a peak flow meter, readings less than 50% of your personal best   Date Last Reviewed: 2/3/2017    3937-5576 [employer]. [address] [place] [address], [place] [address]. All rights reserved. This information is not intended as a substitute for professional medical care. Always follow your healthcare professional's instructions.

## 2020-09-26 NOTE — PROGRESS NOTES
SUBJECTIVE: Kj Fry is a 30 year old male presenting with a chief complaint of pink eye and asthma .  Onset of symptoms was day(s) ago.  Course of illness is worsening.    Severity moderate  Current and Associated symptoms: wheezing  Treatment measures tried include Inhaler (name: alb).  Predisposing factors include HX of asthma.    No past medical history on file.  Allergies   Allergen Reactions     Ceclor [Cefaclor] Hives     Seasonal Allergies      Social History     Tobacco Use     Smoking status: Never Smoker     Smokeless tobacco: Never Used   Substance Use Topics     Alcohol use: Not on file       ROS:  SKIN: no rash  GI: no vomiting    OBJECTIVE:  BP (!) 141/93   Pulse 80   Temp 98.2  F (36.8  C) (Temporal)   Resp 16   Wt 104.3 kg (230 lb)   SpO2 96%   BMI 34.97 kg/m  GENERAL APPEARANCE: healthy, alert and no distress  EYES: conjunctiva/corneas- conjunctival injection OU and yellow colored discharge present bilateral  HENT: ear canals and TM's normal.  Nose and mouth without ulcers, erythema or lesions  NECK: supple, nontender, no lymphadenopathy  RESP: lungs clear to auscultation - no rales, rhonchi or wheezes  CV: regular rates and rhythm, normal S1 S2, no murmur noted  SKIN: no suspicious lesions or rashes      ICD-10-CM    1. Pink eye, unspecified laterality  H10.029 tobramycin (TOBREX) 0.3 % ophthalmic solution   2. Exacerbation of asthma, unspecified asthma severity, unspecified whether persistent  J45.901 albuterol (PROAIR HFA) 108 (90 Base) MCG/ACT inhaler     fluticasone-salmeterol (ADVAIR) 250-50 MCG/DOSE inhaler       Fluids/Rest, f/u if worse/not any better

## 2020-12-01 ENCOUNTER — OFFICE VISIT (OUTPATIENT)
Dept: URGENT CARE | Facility: URGENT CARE | Age: 30
End: 2020-12-01
Payer: COMMERCIAL

## 2020-12-01 ENCOUNTER — ANCILLARY PROCEDURE (OUTPATIENT)
Dept: GENERAL RADIOLOGY | Facility: CLINIC | Age: 30
End: 2020-12-01
Attending: FAMILY MEDICINE
Payer: COMMERCIAL

## 2020-12-01 VITALS — HEART RATE: 80 BPM | OXYGEN SATURATION: 96 % | TEMPERATURE: 98.7 F | RESPIRATION RATE: 16 BRPM

## 2020-12-01 DIAGNOSIS — R05.9 COUGH: ICD-10-CM

## 2020-12-01 DIAGNOSIS — J03.90 TONSILLITIS: ICD-10-CM

## 2020-12-01 DIAGNOSIS — R06.02 SOB (SHORTNESS OF BREATH): ICD-10-CM

## 2020-12-01 DIAGNOSIS — R07.0 THROAT PAIN: Primary | ICD-10-CM

## 2020-12-01 LAB
DEPRECATED S PYO AG THROAT QL EIA: NEGATIVE
SPECIMEN SOURCE: NORMAL

## 2020-12-01 PROCEDURE — 99N1174 PR STATISTIC STREP A RAPID: Performed by: FAMILY MEDICINE

## 2020-12-01 PROCEDURE — U0003 INFECTIOUS AGENT DETECTION BY NUCLEIC ACID (DNA OR RNA); SEVERE ACUTE RESPIRATORY SYNDROME CORONAVIRUS 2 (SARS-COV-2) (CORONAVIRUS DISEASE [COVID-19]), AMPLIFIED PROBE TECHNIQUE, MAKING USE OF HIGH THROUGHPUT TECHNOLOGIES AS DESCRIBED BY CMS-2020-01-R: HCPCS | Performed by: FAMILY MEDICINE

## 2020-12-01 PROCEDURE — 87651 STREP A DNA AMP PROBE: CPT | Performed by: FAMILY MEDICINE

## 2020-12-01 PROCEDURE — 71046 X-RAY EXAM CHEST 2 VIEWS: CPT | Performed by: RADIOLOGY

## 2020-12-01 PROCEDURE — 99214 OFFICE O/P EST MOD 30 MIN: CPT | Performed by: FAMILY MEDICINE

## 2020-12-01 RX ORDER — PREDNISONE 20 MG/1
20 TABLET ORAL 2 TIMES DAILY
Qty: 10 TABLET | Refills: 0 | Status: SHIPPED | OUTPATIENT
Start: 2020-12-01 | End: 2020-12-06

## 2020-12-01 RX ORDER — AZITHROMYCIN 250 MG/1
TABLET, FILM COATED ORAL
Qty: 6 TABLET | Refills: 0 | Status: SHIPPED | OUTPATIENT
Start: 2020-12-01 | End: 2020-12-06

## 2020-12-01 NOTE — PROGRESS NOTES
SUBJECTIVE: Kj Fry is a 30 year old male presenting with a chief complaint of sore throat and SOB/cough.  Onset of symptoms was day(s) ago.  Course of illness is same.    Severity moderate  Current and Associated symptoms: cough - non-productive  Treatment measures tried include Tylenol/Ibuprofen.  Predisposing factors include None.    No past medical history on file.  Allergies   Allergen Reactions     Ceclor [Cefaclor] Hives     Seasonal Allergies      Social History     Tobacco Use     Smoking status: Never Smoker     Smokeless tobacco: Never Used   Substance Use Topics     Alcohol use: Not on file       ROS:  SKIN: no rash  GI: no vomiting    OBJECTIVE:  Pulse 80   Temp 98.7  F (37.1  C)   Resp 16   SpO2 96% GENERAL APPEARANCE: healthy, alert and no distress  EYES: EOMI,  PERRL, conjunctiva clear  HENT: ear canals and TM's normal.  Nose and mouth without ulcers, erythema or lesions  NECK: supple, nontender, no lymphadenopathy  RESP: lungs clear to auscultation - no rales, rhonchi or wheezes  SKIN: no suspicious lesions or rashes      ICD-10-CM    1. Throat pain  R07.0 Streptococcus A Rapid Scr w Reflx to PCR     Symptomatic COVID-19 Virus (Coronavirus) by PCR   2. Cough  R05 Symptomatic COVID-19 Virus (Coronavirus) by PCR     XR Chest 2 Views     Quarantine  PPE used  No AGP  Pt masked  Fluids/Rest, f/u if worse/not any better

## 2020-12-02 LAB
SPECIMEN SOURCE: NORMAL
STREP GROUP A PCR: NOT DETECTED

## 2020-12-02 NOTE — PATIENT INSTRUCTIONS
"Discharge Instructions for COVID-19 Patients  You have--or may have--COVID-19. Please follow the instructions listed below.   If you have a weakened immune system, discuss with your doctor any other actions you need to take.  How can I protect others?  If you have symptoms (fever, cough, body aches or trouble breathing):    Stay home and away from others (self-isolate) until:  ? At least 10 days have passed since your symptoms started, And   ? You've had no fever--and no medicine that reduces fever--for 1 full day (24 hours), And    ? Your other symptoms have resolved (gotten better).  If you don't show symptoms, but testing showed that you have COVID-19:    Stay home and away from others (self-isolate). Follow the tips under \"How do I self-isolate?\" below for 10 days (20 days if you have a weak immune system).    You don't need to be retested for COVID-19 before going back to school or work. As long as you're fever-free and feeling better, you can go back to school, work and other activities after waiting the 10 or 20 days.   How do I self-isolate?    Stay in your own room, even for meals. Use your own bathroom if you can.    Stay away from others in your home. No hugging, kissing or shaking hands. No visitors.    Don't go to work, school or anywhere else.    Clean \"high touch\" surfaces often (doorknobs, counters, handles). Use household cleaning spray or wipes. You'll find a full list of  on the EPA website: www.epa.gov/pesticide-registration/list-n-disinfectants-use-against-sars-cov-2.    Cover your mouth and nose with a mask or other face covering to avoid spreading germs.    Wash your hands and face often. Use soap and water.    Caregivers in these groups are at risk for severe illness due to COVID-19:  ? People 65 years and older  ? People who live in a nursing home or long-term care facility  ? People with chronic disease (lung, heart, cancer, diabetes, kidney, liver, immunologic)  ? People who have a " weakened immune system, including those who:    Are in cancer treatment    Take medicine that weakens the immune system, such as corticosteroids    Had a bone marrow or organ transplant    Have an immune deficiency    Have poorly controlled HIV or AIDS    Are obese (body mass index of 40 or higher)    Smoke regularly    Caregivers should wear gloves while washing dishes, handling laundry and cleaning bedrooms and bathrooms.    Use caution when washing and drying laundry: Don't shake dirty laundry and use the warmest water setting that you can.    For more tips on managing your health at home, go to www.cdc.gov/coronavirus/2019-ncov/downloads/10Things.pdf.  How can I take care of myself at home?  1. Get lots of rest. Drink extra fluids (unless a doctor has told you not to).    2. Take Tylenol (acetaminophen) for fever or pain. If you have liver or kidney problems, ask your family doctor if it's okay to take Tylenol.     Adults can take either:  ? 650 mg (two 325 mg pills) every 4 to 6 hours, or   ? 1,000 mg (two 500 mg pills) every 8 hours as needed.  ? Note: Don't take more than 3,000 mg in one day. Acetaminophen is found in many medicines (both prescribed and over-the-counter medicines). Read all labels to be sure you don't take too much.   For children, check the Tylenol bottle for the right dose. The dose is based on the child's age or weight.  3. If you have other health problems (like cancer, heart failure, an organ transplant or severe kidney disease): Call your specialty clinic if you don't feel better in the next 2 days.    4. Know when to call 911. Emergency warning signs include:  ? Trouble breathing or shortness of breath  ? Pain or pressure in the chest that doesn't go away  ? Feeling confused like you haven't felt before, or not being able to wake up  ? Bluish-colored lips or face    5. Your doctor may have prescribed a blood thinner medicine. Follow their instructions.  Where can I get more  information?    [facility] - About COVID-19: [email or web address]    CDC - What to Do If You're Sick: www.cdc.gov/coronavirus/2019-ncov/about/steps-when-sick.html    CDC - Ending Home Isolation: www.cdc.gov/coronavirus/2019-ncov/hcp/disposition-in-home-patients.html    CDC - Caring for Someone: www.cdc.gov/coronavirus/2019-ncov/if-you-are-sick/care-for-someone.html    [facility] - Interim Guidance for Hospital Discharge to Home: www.health.[facility].[place]./diseases/coronavirus/hcp/hospdischarge.pdf    [facility] clinical trials (COVID-19 research studies): [email or web address]    Below are the COVID-19 hotlines at the [place] Department of [facility] ([facility]). Interpreters are available.  ? For health questions: Call 771-303-1009 or 1-645.628.7747 (7 a.m. to 7 p.m.)  ? For questions about schools and childcare: Call 682-859-7235 or 1-362.698.5786 (7 a.m. to 7 p.m.)    For informational purposes only. Not to replace the advice of your health care provider. Clinically reviewed by the Infection Prevention Team. Copyright   2020 [employer]. All rights reserved. [employer] [id number] - REV 08/04/20.

## 2020-12-04 LAB
SARS-COV-2 RNA SPEC QL NAA+PROBE: NOT DETECTED
SPECIMEN SOURCE: NORMAL